# Patient Record
Sex: FEMALE | Race: WHITE
[De-identification: names, ages, dates, MRNs, and addresses within clinical notes are randomized per-mention and may not be internally consistent; named-entity substitution may affect disease eponyms.]

---

## 2019-07-15 ENCOUNTER — HOSPITAL ENCOUNTER (OUTPATIENT)
Dept: HOSPITAL 91 - GIL | Age: 64
Discharge: HOME | End: 2019-07-15
Payer: MEDICARE

## 2019-07-15 ENCOUNTER — HOSPITAL ENCOUNTER (OUTPATIENT)
Dept: HOSPITAL 10 - GIL | Age: 64
Discharge: HOME | End: 2019-07-15
Attending: INTERNAL MEDICINE
Payer: MEDICARE

## 2019-07-15 VITALS
HEIGHT: 64 IN | BODY MASS INDEX: 36.02 KG/M2 | WEIGHT: 210.98 LBS | BODY MASS INDEX: 36.02 KG/M2 | HEIGHT: 64 IN | WEIGHT: 210.98 LBS

## 2019-07-15 VITALS — SYSTOLIC BLOOD PRESSURE: 132 MMHG | HEART RATE: 75 BPM | DIASTOLIC BLOOD PRESSURE: 74 MMHG

## 2019-07-15 VITALS — HEART RATE: 76 BPM | DIASTOLIC BLOOD PRESSURE: 70 MMHG | RESPIRATION RATE: 10 BRPM | SYSTOLIC BLOOD PRESSURE: 130 MMHG

## 2019-07-15 VITALS — RESPIRATION RATE: 16 BRPM | HEART RATE: 76 BPM | SYSTOLIC BLOOD PRESSURE: 130 MMHG | DIASTOLIC BLOOD PRESSURE: 70 MMHG

## 2019-07-15 DIAGNOSIS — E78.5: ICD-10-CM

## 2019-07-15 DIAGNOSIS — K64.8: ICD-10-CM

## 2019-07-15 DIAGNOSIS — I10: ICD-10-CM

## 2019-07-15 DIAGNOSIS — K21.9: ICD-10-CM

## 2019-07-15 DIAGNOSIS — K92.1: Primary | ICD-10-CM

## 2019-07-15 PROCEDURE — 88305 TISSUE EXAM BY PATHOLOGIST: CPT

## 2019-07-15 PROCEDURE — 43239 EGD BIOPSY SINGLE/MULTIPLE: CPT

## 2019-07-15 PROCEDURE — 45378 DIAGNOSTIC COLONOSCOPY: CPT

## 2019-07-15 NOTE — PREAC
Date/Time of Note


Date/Time of Note


DATE: 7/15/19 


TIME: 11:00





Anesthesia Eval and Record


Evaluation


Time Pre-Procedure Interview


DATE: 7/15/19 


TIME: 11:00


Age


64


Sex


female


NPO:  8 hrs


Preoperative diagnosis


REFLUX, POSITIVE OB


Planned procedure


EGD, COLONOSCOPY





Past Medical History


Past Medical History:  Includes


Cardio:  HTN, Dyslipidemia


Psych:  Depression





Surgery & Anesthesia Issues


No known issue





Meds


Anticoagulation:  No


Beta Blocker within 24 hr:  Yes


Reported Medications


[Ibuprofen]   No Conflict Check


   7/15/19


[Atorvastatin]   No Conflict Check


   7/15/19


[Effexor]   No Conflict Check


   7/15/19


[Amlodipine]   No Conflict Check


   7/15/19


[Losartan]   No Conflict Check


   7/15/19


[Vitamin D3]   No Conflict Check


   7/15/19


[Metoprolol]   No Conflict Check


   7/15/19


[Meloxicam]   No Conflict Check


   7/15/19


[Asa 81]   No Conflict Check


   7/15/19


[Pantoprazole]   No Conflict Check


   7/15/19


Meds reviewed:  Yes





Allergies


Coded Allergies:  


     No Known Allergy (Unverified , 7/15/19)


Allergies Reviewed:  Yes





Labs/Studies


Labs Reviewed:  Reviewed by anesthesiologist


Pregnancy test:  N/A





Pre-procedure Exam


Airway:  Adequate mouth opening, Adequate thyromental dist


Mallampati:  Mallampati II


Teeth:  Normal


Lung:  Normal


Heart:  Normal





ASA Physical Status


ASA physical status:  2


Emergency:  None





Planned Anesthetic


General/MAC:  MAC





Planned Pain Management


Parenteral pain med





Pre-operative Attestations


Prior to commencing anesthesia and surgery, the patient was re-evaluated, there 


was verification of:


*The patient's identity


*The results of appropriate recent lab work and preoperative vital signs


*The above evaluation not changing prior to induction


*Anesthetic plan, risk benefits, alternative and complications discussed with 


patient/family; questions answered; patient/family understands, accepts and 


wishes to proceed.











Eamon Nobles M.D.      Jul 15, 2019 11:01

## 2019-07-15 NOTE — PAC
Date/Time of Note


Date/Time of Note


DATE: 7/15/19 


TIME: 12:25





Post-Anesthesia Notes


Post-Anesthesia Note


Last documented vital signs





Vital Signs


  Date      Temp  Pulse  Resp  B/P (MAP)   Pulse Ox  O2          O2 Flow    FiO2


Time                                                 Delivery    Rate


   7/15/19  98.0     76    10      130/70        99  Room Air


     11:26                           (90)





Activity:  WNL


Respiratory function:  WNL


Cardiovascular function:  WNL


Mental status:  Baseline


Pain reasonably controlled:  Yes


Hydration appropriate:  Yes


Nausea/Vomiting absent:  Yes











Eamon Nobles M.D.      Jul 15, 2019 12:25